# Patient Record
Sex: MALE | Race: WHITE | HISPANIC OR LATINO | Employment: FULL TIME | URBAN - METROPOLITAN AREA
[De-identification: names, ages, dates, MRNs, and addresses within clinical notes are randomized per-mention and may not be internally consistent; named-entity substitution may affect disease eponyms.]

---

## 2023-02-05 ENCOUNTER — APPOINTMENT (OUTPATIENT)
Dept: RADIOLOGY | Facility: MEDICAL CENTER | Age: 38
End: 2023-02-05
Attending: EMERGENCY MEDICINE

## 2023-02-05 ENCOUNTER — HOSPITAL ENCOUNTER (EMERGENCY)
Facility: MEDICAL CENTER | Age: 38
End: 2023-02-06
Attending: EMERGENCY MEDICINE

## 2023-02-05 DIAGNOSIS — F10.921 ALCOHOL INTOXICATION WITH DELIRIUM (HCC): ICD-10-CM

## 2023-02-05 LAB
ALBUMIN SERPL BCP-MCNC: 4.3 G/DL (ref 3.2–4.9)
ALBUMIN/GLOB SERPL: 1.5 G/DL
ALP SERPL-CCNC: 84 U/L (ref 30–99)
ALT SERPL-CCNC: 29 U/L (ref 2–50)
ANION GAP SERPL CALC-SCNC: 14 MMOL/L (ref 7–16)
AST SERPL-CCNC: 16 U/L (ref 12–45)
BASOPHILS # BLD AUTO: 0.9 % (ref 0–1.8)
BASOPHILS # BLD: 0.1 K/UL (ref 0–0.12)
BILIRUB SERPL-MCNC: <0.2 MG/DL (ref 0.1–1.5)
BUN SERPL-MCNC: 18 MG/DL (ref 8–22)
CALCIUM ALBUM COR SERPL-MCNC: 9.2 MG/DL (ref 8.5–10.5)
CALCIUM SERPL-MCNC: 9.4 MG/DL (ref 8.5–10.5)
CHLORIDE SERPL-SCNC: 106 MMOL/L (ref 96–112)
CO2 SERPL-SCNC: 22 MMOL/L (ref 20–33)
CREAT SERPL-MCNC: 0.88 MG/DL (ref 0.5–1.4)
EKG IMPRESSION: NORMAL
EOSINOPHIL # BLD AUTO: 0.18 K/UL (ref 0–0.51)
EOSINOPHIL NFR BLD: 1.7 % (ref 0–6.9)
ERYTHROCYTE [DISTWIDTH] IN BLOOD BY AUTOMATED COUNT: 40.4 FL (ref 35.9–50)
ETHANOL BLD-MCNC: 218.3 MG/DL
GFR SERPLBLD CREATININE-BSD FMLA CKD-EPI: 115 ML/MIN/1.73 M 2
GLOBULIN SER CALC-MCNC: 2.9 G/DL (ref 1.9–3.5)
GLUCOSE SERPL-MCNC: 134 MG/DL (ref 65–99)
HCT VFR BLD AUTO: 47 % (ref 42–52)
HGB BLD-MCNC: 15.5 G/DL (ref 14–18)
IMM GRANULOCYTES # BLD AUTO: 0.03 K/UL (ref 0–0.11)
IMM GRANULOCYTES NFR BLD AUTO: 0.3 % (ref 0–0.9)
LACTATE SERPL-SCNC: 3.8 MMOL/L (ref 0.5–2)
LIPASE SERPL-CCNC: 53 U/L (ref 11–82)
LYMPHOCYTES # BLD AUTO: 3.66 K/UL (ref 1–4.8)
LYMPHOCYTES NFR BLD: 34.3 % (ref 22–41)
MCH RBC QN AUTO: 28.2 PG (ref 27–33)
MCHC RBC AUTO-ENTMCNC: 33 G/DL (ref 33.7–35.3)
MCV RBC AUTO: 85.5 FL (ref 81.4–97.8)
MONOCYTES # BLD AUTO: 0.79 K/UL (ref 0–0.85)
MONOCYTES NFR BLD AUTO: 7.4 % (ref 0–13.4)
NEUTROPHILS # BLD AUTO: 5.91 K/UL (ref 1.82–7.42)
NEUTROPHILS NFR BLD: 55.4 % (ref 44–72)
NRBC # BLD AUTO: 0 K/UL
NRBC BLD-RTO: 0 /100 WBC
PLATELET # BLD AUTO: 272 K/UL (ref 164–446)
PMV BLD AUTO: 10 FL (ref 9–12.9)
POTASSIUM SERPL-SCNC: 3.5 MMOL/L (ref 3.6–5.5)
PROT SERPL-MCNC: 7.2 G/DL (ref 6–8.2)
RBC # BLD AUTO: 5.5 M/UL (ref 4.7–6.1)
SODIUM SERPL-SCNC: 142 MMOL/L (ref 135–145)
WBC # BLD AUTO: 10.7 K/UL (ref 4.8–10.8)

## 2023-02-05 PROCEDURE — 96374 THER/PROPH/DIAG INJ IV PUSH: CPT

## 2023-02-05 PROCEDURE — 71045 X-RAY EXAM CHEST 1 VIEW: CPT

## 2023-02-05 PROCEDURE — 83690 ASSAY OF LIPASE: CPT

## 2023-02-05 PROCEDURE — 80053 COMPREHEN METABOLIC PANEL: CPT

## 2023-02-05 PROCEDURE — 99285 EMERGENCY DEPT VISIT HI MDM: CPT

## 2023-02-05 PROCEDURE — 85025 COMPLETE CBC W/AUTO DIFF WBC: CPT

## 2023-02-05 PROCEDURE — 36415 COLL VENOUS BLD VENIPUNCTURE: CPT

## 2023-02-05 PROCEDURE — 700111 HCHG RX REV CODE 636 W/ 250 OVERRIDE (IP): Performed by: EMERGENCY MEDICINE

## 2023-02-05 PROCEDURE — 700105 HCHG RX REV CODE 258: Performed by: EMERGENCY MEDICINE

## 2023-02-05 PROCEDURE — 83605 ASSAY OF LACTIC ACID: CPT

## 2023-02-05 PROCEDURE — 93005 ELECTROCARDIOGRAM TRACING: CPT | Performed by: EMERGENCY MEDICINE

## 2023-02-05 PROCEDURE — 82077 ASSAY SPEC XCP UR&BREATH IA: CPT

## 2023-02-05 RX ORDER — SODIUM CHLORIDE 9 MG/ML
1000 INJECTION, SOLUTION INTRAVENOUS ONCE
Status: COMPLETED | OUTPATIENT
Start: 2023-02-05 | End: 2023-02-06

## 2023-02-05 RX ORDER — ONDANSETRON 2 MG/ML
4 INJECTION INTRAMUSCULAR; INTRAVENOUS ONCE
Status: COMPLETED | OUTPATIENT
Start: 2023-02-05 | End: 2023-02-05

## 2023-02-05 RX ADMIN — ONDANSETRON 4 MG: 2 INJECTION INTRAMUSCULAR; INTRAVENOUS at 23:15

## 2023-02-05 RX ADMIN — SODIUM CHLORIDE 1000 ML: 9 INJECTION, SOLUTION INTRAVENOUS at 22:45

## 2023-02-06 VITALS
OXYGEN SATURATION: 92 % | SYSTOLIC BLOOD PRESSURE: 101 MMHG | TEMPERATURE: 97.4 F | BODY MASS INDEX: 27.32 KG/M2 | HEART RATE: 93 BPM | DIASTOLIC BLOOD PRESSURE: 59 MMHG | RESPIRATION RATE: 18 BRPM | HEIGHT: 66 IN | WEIGHT: 170 LBS

## 2023-02-06 NOTE — ED NOTES
"Pt awake and alert. Aox4. Wife at bedside. Pt states \" I dont drink everyday, just on Saturdays.\" He states he feels so much better now\".   Via .   "

## 2023-02-06 NOTE — ED NOTES
PT resting comfortably, call bell within reach. No signs of acute distress or further needs expressed at this time.

## 2023-02-06 NOTE — DISCHARGE SUMMARY
"  ED Observation Discharge Summary    Patient:Nina Seventy-Seven  Patient : 1985  Patient MRN: [id number]  Patient PCP: Pcp Pt States None    Admit Date: 2023  Discharge Date and Time: 23 5:05 AM  Discharge Diagnosis: Alcohol intoxication  Discharge Attending: Octaviano De La Rosa M.D.  Discharge Service: ED Observation    ED Course  Nina is a 37 y.o. male who was evaluated at [facility] acute alcohol intoxication.  Patient reportedly consumed 2 bottles of tequila tonight.  Patient at this time is acting appropriately, tolerating oral intake, ambulating without assistance, clinically sober.  Patient's partner at the bedside and will accompany the patient home.  No evidence of trauma.    Discharge Exam:  /59   Pulse 93   Temp 36.1 °C (97 °F) (Temporal)   Resp 18   Ht 1.676 m (5' 6\")   Wt 77.1 kg (170 lb)   SpO2 92%   BMI 27.44 kg/m² .    Constitutional: Awake and alert. Nontoxic  HENT:  Grossly normal  Eyes: Grossly normal  Neck: Normal range of motion  Cardiovascular: Normal heart rate   Thorax & Lungs: No respiratory distress  Abdomen: Nontender  Skin:  No pathologic rash.   Extremities: Well perfused  Psychiatric: Affect normal    Labs  Results for orders placed or performed during the hospital encounter of 23   DIAGNOSTIC ALCOHOL   Result Value Ref Range    Diagnostic Alcohol 218.3 (H) <10.1 mg/dL   CBC WITH DIFFERENTIAL   Result Value Ref Range    WBC 10.7 4.8 - 10.8 K/uL    RBC 5.50 4.70 - 6.10 M/uL    Hemoglobin 15.5 14.0 - 18.0 g/dL    Hematocrit 47.0 42.0 - 52.0 %    MCV 85.5 81.4 - 97.8 fL    MCH 28.2 27.0 - 33.0 pg    MCHC 33.0 (L) 33.7 - 35.3 g/dL    RDW 40.4 35.9 - 50.0 fL    Platelet Count 272 164 - 446 K/uL    MPV 10.0 9.0 - 12.9 fL    Neutrophils-Polys 55.40 44.00 - 72.00 %    Lymphocytes 34.30 22.00 - 41.00 %    Monocytes 7.40 0.00 - 13.40 %    Eosinophils 1.70 0.00 - 6.90 %    Basophils 0.90 0.00 - 1.80 %    Immature Granulocytes 0.30 0.00 - 0.90 %    Nucleated " RBC 0.00 /100 WBC    Neutrophils (Absolute) 5.91 1.82 - 7.42 K/uL    Lymphs (Absolute) 3.66 1.00 - 4.80 K/uL    Monos (Absolute) 0.79 0.00 - 0.85 K/uL    Eos (Absolute) 0.18 0.00 - 0.51 K/uL    Baso (Absolute) 0.10 0.00 - 0.12 K/uL    Immature Granulocytes (abs) 0.03 0.00 - 0.11 K/uL    NRBC (Absolute) 0.00 K/uL   COMP METABOLIC PANEL   Result Value Ref Range    Sodium 142 135 - 145 mmol/L    Potassium 3.5 (L) 3.6 - 5.5 mmol/L    Chloride 106 96 - 112 mmol/L    Co2 22 20 - 33 mmol/L    Anion Gap 14.0 7.0 - 16.0    Glucose 134 (H) 65 - 99 mg/dL    Bun 18 8 - 22 mg/dL    Creatinine 0.88 0.50 - 1.40 mg/dL    Calcium 9.4 8.5 - 10.5 mg/dL    AST(SGOT) 16 12 - 45 U/L    ALT(SGPT) 29 2 - 50 U/L    Alkaline Phosphatase 84 30 - 99 U/L    Total Bilirubin <0.2 0.1 - 1.5 mg/dL    Albumin 4.3 3.2 - 4.9 g/dL    Total Protein 7.2 6.0 - 8.2 g/dL    Globulin 2.9 1.9 - 3.5 g/dL    A-G Ratio 1.5 g/dL   LIPASE   Result Value Ref Range    Lipase 53 11 - 82 U/L   LACTIC ACID   Result Value Ref Range    Lactic Acid 3.8 (H) 0.5 - 2.0 mmol/L   ESTIMATED GFR   Result Value Ref Range    GFR (CKD-EPI) 115 >60 mL/min/1.73 m 2   CORRECTED CALCIUM   Result Value Ref Range    Correct Calcium 9.2 8.5 - 10.5 mg/dL   EKG   Result Value Ref Range    Report       [facility] Emergency Dept.    Test Date:  2023  Pt Name:    SPROUT SEVENTY-SEVEN         Department: ER  MRN:        [id number]                      Room:       [facility]  Gender:     M                            Technician:  :        1985                   Requested By:CINTIA DAVEY  Order #:    [id number]                    Alexis MD: Mp Lilly MD    Measurements  Intervals                                Axis  Rate:       63                           P:          37  TN:         177                          QRS:        68  QRSD:       103                          T:          25  QT:         393  QTc:        403    Interpretive Statements  Sinus rhythm  No  previous ECG available for comparison  Electronically Signed On 2-5-2023 23:52:12 PST by Mp Lilly MD         Radiology  DX-CHEST-PORTABLE (1 VIEW)   Final Result         1.  Hazy left lower lobe infiltrates.          Medications:   New Prescriptions    No medications on file       My final assessment includes reevaluation of mental status, patient is sober at this time.  Upon Reevaluation, the patient's condition has: Improved; and will be discharged.    Patient discharged from ED Observation status at 0506 (Time) 2/6/23 (Date).     Total time spent on this ED Observation discharge encounter is < 30 Minutes    Electronically signed by: Octaviano De La Rosa M.D., 2/6/2023 5:05 AM

## 2023-02-06 NOTE — ED NOTES
.Pt stable for discharge. Pt educated and reviewed discharge instructions with RN. Pt verbalized understanding & all questions were answered. Pt AoX 4. Pt ambulated independently with balanced and steady gait out of the ED with all belongings. Pt encouraged to come back if symptoms worsen. RN used IPAD .

## 2023-02-06 NOTE — ED NOTES
.Assumed report and patient care from JENNIFER Vazquez. Patient is resting comfortably in bed with no signs of labored breathing or restlessness. Safety measures in place, call light within reach.

## 2023-02-06 NOTE — ED TRIAGE NOTES
"Chief Complaint   Patient presents with    Alcohol Intoxication     PT responsive to pain upon arrival, per brother consumed 2 bottles of tequila tonight.      PT roomed immediately to red 1 from triage, ERP at bedside.    /74   Pulse 68   Resp 19   Ht 1.676 m (5' 6\")   Wt 77.1 kg (170 lb)   SpO2 (!) 84%   BMI 27.44 kg/m²     "

## 2023-02-06 NOTE — ED PROVIDER NOTES
"ED Provider Note    CHIEF COMPLAINT  Chief Complaint   Patient presents with    Alcohol Intoxication     PT responsive to pain upon arrival, per brother consumed 2 bottles of tequila tonight.        LUKE Link is a 89 y.o. adult who presents for evaluation of altered level of consciousness which, according to the patient's brother who accompanies him, is likely due to the 2 bottles of tequila that he drank tonight.  Patient's brother notes that the patient was sober for period of months but then started drinking again tonight for some reason.  All history is through the patient's brother as the patient is only responsive to repeated command, noxious stimuli and nonverbal.  External records reviewed-care everywhere  Limitation to history-alcohol intoxication  Outside historians-Brother    REVIEW OF SYSTEMS  Unable to obtain due to clinical condition    PAST FAM HISTORY  No family history on file.    PAST MEDICAL HISTORY   Alcohol abuse    SOCIAL HISTORY  Social History     Tobacco Use    Smoking status: Not on file    Smokeless tobacco: Not on file   Substance and Sexual Activity    Alcohol use: Not on file    Drug use: Not on file    Sexual activity: Not on file       SURGICAL HISTORY  patient denies any surgical history    CURRENT MEDICATIONS  Home Medications    **Home medications have not yet been reviewed for this encounter**         ALLERGIES  Not on File    PHYSICAL EXAM  VITAL SIGNS: /64   Pulse 78   Temp 36.1 °C (97 °F) (Temporal)   Resp (!) 22   Ht 1.676 m (5' 6\")   Wt 77.1 kg (170 lb)   SpO2 96%   BMI 27.44 kg/m²    Gen: Appears to be sleeping, otherwise no apparent distress.  HEENT: No signs of trauma, Bilateral external ears normal, Nose normal.  Conjunctiva injected bilaterally, pupils 3 to 4 mm, equal and reactive.  Neck:  No apparent tenderness, Supple, No masses  Lymphatic: No cervical lymphadenopathy noted.   Cardiovascular: Regular rate and rhythm, no murmurs.  " Capillary refill less than 3 seconds to all extremities, 2+ distal pulses.  Thorax & Lungs: Normal breath sounds, No respiratory distress, No wheezing bilateral chest rise  Abdomen: Bowel sounds normal, Soft, No apparent tenderness  Skin: Warm, Dry, No erythema, No rash noted to exposed areas.   Extremities: Intact distal pulses, No edema  Neurologic: GCS 10, localizes to pain, incomprehensible verbal sounds, eyes open on command.      INITIAL IMPRESSION  Patient arrives for evaluation of what appears to be alcohol intoxication.  He apparently drank 2 bottles of tequila although it is unclear how much was in each bottle and when he actually drank it.  Patient does have borderline low oxygen saturation but does not appear to be in any respiratory distress otherwise.  He is not tachycardic and is normotensive.  Patient will be placed on 2 L per nasal cannula while work-up is being obtained.  There is no findings to suggest recent trauma and the patient's brother denies any recent falls or injuries.  Patient has no chronic medical issues aside from alcoholism and is on no medications.  Unlikely that CT imaging of the head will be necessary as he had no trauma and does open his eyes to command.  I do not suspect intracranial hemorrhage.    Escalation of care considered, and ultimately not performed: Intubation, CT brain    Barriers to care at this time, including but not limited to: Alcohol intoxication    Diagnostic tests and prescription drugs considered including, but not limited to: Narcan      LABS  Results for orders placed or performed during the hospital encounter of 02/05/23   DIAGNOSTIC ALCOHOL   Result Value Ref Range    Diagnostic Alcohol 218.3 (H) <10.1 mg/dL   CBC WITH DIFFERENTIAL   Result Value Ref Range    WBC 10.7 4.8 - 10.8 K/uL    RBC 5.50 4.70 - 6.10 M/uL    Hemoglobin 15.5 14.0 - 18.0 g/dL    Hematocrit 47.0 42.0 - 52.0 %    MCV 85.5 81.4 - 97.8 fL    MCH 28.2 27.0 - 33.0 pg    MCHC 33.0 (L) 33.7 -  35.3 g/dL    RDW 40.4 35.9 - 50.0 fL    Platelet Count 272 164 - 446 K/uL    MPV 10.0 9.0 - 12.9 fL    Neutrophils-Polys 55.40 44.00 - 72.00 %    Lymphocytes 34.30 22.00 - 41.00 %    Monocytes 7.40 0.00 - 13.40 %    Eosinophils 1.70 0.00 - 6.90 %    Basophils 0.90 0.00 - 1.80 %    Immature Granulocytes 0.30 0.00 - 0.90 %    Nucleated RBC 0.00 /100 WBC    Neutrophils (Absolute) 5.91 1.82 - 7.42 K/uL    Lymphs (Absolute) 3.66 1.00 - 4.80 K/uL    Monos (Absolute) 0.79 0.00 - 0.85 K/uL    Eos (Absolute) 0.18 0.00 - 0.51 K/uL    Baso (Absolute) 0.10 0.00 - 0.12 K/uL    Immature Granulocytes (abs) 0.03 0.00 - 0.11 K/uL    NRBC (Absolute) 0.00 K/uL   COMP METABOLIC PANEL   Result Value Ref Range    Sodium 142 135 - 145 mmol/L    Potassium 3.5 (L) 3.6 - 5.5 mmol/L    Chloride 106 96 - 112 mmol/L    Co2 22 20 - 33 mmol/L    Anion Gap 14.0 7.0 - 16.0    Glucose 134 (H) 65 - 99 mg/dL    Bun 18 8 - 22 mg/dL    Creatinine 0.88 0.50 - 1.40 mg/dL    Calcium 9.4 8.5 - 10.5 mg/dL    AST(SGOT) 16 12 - 45 U/L    ALT(SGPT) 29 2 - 50 U/L    Alkaline Phosphatase 84 30 - 99 U/L    Total Bilirubin <0.2 0.1 - 1.5 mg/dL    Albumin 4.3 3.2 - 4.9 g/dL    Total Protein 7.2 6.0 - 8.2 g/dL    Globulin 2.9 1.9 - 3.5 g/dL    A-G Ratio 1.5 g/dL   LIPASE   Result Value Ref Range    Lipase 53 11 - 82 U/L   LACTIC ACID   Result Value Ref Range    Lactic Acid 3.8 (H) 0.5 - 2.0 mmol/L   ESTIMATED GFR   Result Value Ref Range    GFR (CKD-EPI) 115 >60 mL/min/1.73 m 2   CORRECTED CALCIUM   Result Value Ref Range    Correct Calcium 9.2 8.5 - 10.5 mg/dL   EKG   Result Value Ref Range    Report       [facility] Emergency Dept.    Test Date:  2023  Pt Name:    SPROUT SEVENTY-SEVEN         Department: ER  MRN:        [id number]                      Room:       [facility]  Gender:                                  Technician:  :        [id number]                   Requested By:CINTIA DAVEY  Order #:    [id number]                    Reading  MD:    Measurements  Intervals                                Axis  Rate:       63                           P:          37  IN:         177                          QRS:        68  QRSD:       103                          T:          25  QT:         393  QTc:        403    Interpretive Statements  Sinus rhythm  Borderline ST elevation, anterior leads  No previous ECG available for comparison         RADIOLOGY  DX-CHEST-PORTABLE (1 VIEW)   Final Result         1.  Hazy left lower lobe infiltrates.          HYDRATION: Based on the patient's presentation of Acute Vomiting and Inability to take oral fluids the patient was given IV fluids. IV Hydration was used because oral hydration was not adequate alone. Upon recheck following hydration, the patient was stable.    COURSE & MEDICAL DECISION MAKING  Pertinent Labs & Imaging studies reviewed. (See chart for details)  11:45 PM  Patient's labs are reassuring but his x-ray does show a small amount of haziness in the left lower lobe which could be related to a small amount of aspiration.  This will be watched closely in terms of his oxygen utilization and respiratory stability while he awaits sobriety.  At this point I do not suspect an alternate substance and I do not suspect that he attempted to hurt himself on purpose.  Patient's brother states understanding that once he is awake, talking, and is able to ambulate, he will likely be discharged.  Until then he will be boarding in the emergency department.    Patient placed on ED observation at 11:48 PM February 5, 2023 pending sobriety and reevaluation.  Patient currently medically stable but is requiring a small amount of supplemental oxygen likely due to his intoxication and hypoventilation but it also could be related to a small amount of aspiration in the left lower lobe.  This will be watched closely.  Likely the patient will be dischargeable if, once he is sober, he is able to ambulate, does not require supplemental  oxygen, and interacts normally.  At this point I do not suspect that his alcohol intoxication is related to an purposeful attempt at self-harm or suicidality.    1:05 AM   Patient still opens eyes weakly to voice but does not engage in conversation.  He appears to be hemodynamically stable and respiratorily stable.  He is still on 2 L of oxygen.  Discussed with his brother criteria for discharge.  As soon as he is able to get up and move and engage in conversation I feel it will be safe to discharge.  In the meantime patient be turned over to oncoming physician, Dr De La Rosa,  pending sobriety.    In addition to the chief complaint, the following problems were addressed: Need for supplemental oxygen    I have discussed management of the patient with the following physicians and GÓMEZ's: None    Discussion of management with other Q or appropriate source(s):   None      FINAL IMPRESSION  1. Alcohol intoxication with delirium (HCC)        Electronically signed by: Mp Lilly M.D., 2/5/2023 10:30 PM